# Patient Record
Sex: MALE | Race: BLACK OR AFRICAN AMERICAN | Employment: UNEMPLOYED | URBAN - METROPOLITAN AREA
[De-identification: names, ages, dates, MRNs, and addresses within clinical notes are randomized per-mention and may not be internally consistent; named-entity substitution may affect disease eponyms.]

---

## 2017-03-06 ENCOUNTER — OFFICE VISIT (OUTPATIENT)
Dept: INTERNAL MEDICINE CLINIC | Age: 32
End: 2017-03-06

## 2017-03-06 VITALS
TEMPERATURE: 98.6 F | SYSTOLIC BLOOD PRESSURE: 116 MMHG | BODY MASS INDEX: 45.32 KG/M2 | DIASTOLIC BLOOD PRESSURE: 70 MMHG | RESPIRATION RATE: 18 BRPM | WEIGHT: 306 LBS | OXYGEN SATURATION: 98 % | HEIGHT: 69 IN | HEART RATE: 99 BPM

## 2017-03-06 DIAGNOSIS — G43.909 MIGRAINE WITHOUT STATUS MIGRAINOSUS, NOT INTRACTABLE, UNSPECIFIED MIGRAINE TYPE: ICD-10-CM

## 2017-03-06 DIAGNOSIS — E78.00 HYPERCHOLESTEREMIA: ICD-10-CM

## 2017-03-06 DIAGNOSIS — Z51.81 ENCOUNTER FOR MONITORING TESTOSTERONE REPLACEMENT THERAPY: ICD-10-CM

## 2017-03-06 DIAGNOSIS — R79.89 LOW TESTOSTERONE: Primary | ICD-10-CM

## 2017-03-06 DIAGNOSIS — Z79.890 ENCOUNTER FOR MONITORING TESTOSTERONE REPLACEMENT THERAPY: ICD-10-CM

## 2017-03-06 DIAGNOSIS — R73.09 ELEVATED GLUCOSE: ICD-10-CM

## 2017-03-06 RX ORDER — TOPIRAMATE 50 MG/1
50 TABLET, FILM COATED ORAL DAILY
Qty: 90 TAB | Refills: 1 | Status: SHIPPED | OUTPATIENT
Start: 2017-03-06 | End: 2017-07-13

## 2017-03-06 NOTE — PATIENT INSTRUCTIONS
Hypogonadism: Care Instructions  Your Care Instructions  Men who have hypogonadism do not make enough testosterone. This hormone allows men to make sperm and to have normal physical male traits. The condition also is known as testosterone deficiency. It can lead to loss of sex drive, weakness, impotence, infertility, and weakened bones. Many things can cause this condition. Causes include injured testicles, certain medicines, an infection, and aging. Having a long-term health problem such as kidney or liver disease or being obese can cause it. So can surgery or radiation treatment for another health problem. It also can be present at birth. It is most often treated with testosterone hormone. You can get the hormone as a shot or through a patch or gel on the skin. Follow-up care is a key part of your treatment and safety. Be sure to make and go to all appointments, and call your doctor if you are having problems. It's also a good idea to know your test results and keep a list of the medicines you take. How can you care for yourself at home? · Take your medicines exactly as prescribed. Call your doctor if you think you are having a problem with your medicine. You will get more details on the specific medicines your doctor prescribes. · Follow your treatment plan. If you use testosterone hormones, follow your doctor's instructions. Hormones can help relieve many of the effects of this condition, such as impotence. But it may take weeks or months for your symptoms to improve. · Get plenty of exercise. And make sure to get plenty of calcium and vitamin D in your diet. Eat more dairy foods and green vegetables. They can help keep your bones from getting weak. · If you have a hard time dealing with this condition, talk to your doctor about joining a support group. Talking with others who have the same problems can help you cope. When should you call for help?   Call your doctor now or seek immediate medical care if:  · You have headaches. · You have problems with your vision. Watch closely for changes in your health, and be sure to contact your doctor if:  · You have trouble getting or keeping an erection. · You have a loss of body hair. · You feel weak or tired a lot of the time. · Your breasts are getting larger. · You do not get better as expected. Where can you learn more? Go to http://madonna-rakesh.info/. Enter 99 [id number] in the search box to learn more about \"Hypogonadism: Care Instructions. \"  Current as of: July 28, 2016  Content Version: 11.1  © 4808-9281 [employer]. Care instructions adapted under license by [employer] (which disclaims liability or warranty for this information). If you have questions about a medical condition or this instruction, always ask your healthcare professional. [address] any warranty or liability for your use of this information.

## 2017-03-06 NOTE — PROGRESS NOTES
HISTORY OF PRESENT ILLNESS  Landy Tyson is a 36 y.o. male. HPI Mr. Radha Higginbotham is here to discuss starting testosterone therapy. Lab Results   Component Value Date/Time    Testosterone 117 10/11/2016 04:05 PM   He has seen cardiology and was advised that everything was normal - had a stress echo. He does have sleep apnea, and has been using the CPAP. He does feel more rested on using it, however is still fatigued which is why he'd like to pursue testosterone therapy. He has lost weight. Wt Readings from Last 3 Encounters:   03/06/17 306 lb (138.8 kg)   10/28/16 320 lb (145.2 kg)   10/11/16 320 lb (145.2 kg)           Review of Systems   Constitutional: Positive for malaise/fatigue (fatigue). Eyes: Negative. Respiratory: Negative for cough and shortness of breath. Cardiovascular: Negative for chest pain and leg swelling. Gastrointestinal: Negative. Neurological: Positive for headaches (has been out of topamax, was helping with headaches. Would like to restart it. ). Physical Exam   Constitutional: He is oriented to person, place, and time. He appears well-developed and well-nourished. No distress. HENT:   Head: Normocephalic and atraumatic. Eyes: Conjunctivae are normal.   Cardiovascular: Normal rate and regular rhythm. Pulmonary/Chest: Effort normal and breath sounds normal. He has no wheezes. Neurological: He is alert and oriented to person, place, and time. Visit Vitals    /70 (BP 1 Location: Left arm, BP Patient Position: Sitting)    Pulse 99    Temp 98.6 °F (37 °C) (Oral)    Resp 18    Ht 5' 9\" (1.753 m)    Wt 306 lb (138.8 kg)    SpO2 98%    BMI 45.19 kg/m2     He will return for fasting labs tomorrow. ASSESSMENT and PLAN    ICD-10-CM ICD-9-CM    1. Low testosterone E29.1 257.2 TESTOSTERONE, FREE & TOTAL      PROSTATE SPECIFIC AG (PSA)   2.  Migraine without status migrainosus, not intractable, unspecified migraine type G43.909 346.90 topiramate (TOPAMAX) 50 mg tablet   3. Elevated glucose J83.77 938.44 METABOLIC PANEL, COMPREHENSIVE      HEMOGLOBIN A1C WITH EAG   4. Encounter for monitoring testosterone replacement therapy Z51.81 V58.83 TESTOSTERONE, FREE & TOTAL    Z79.899 V58.69 PROSTATE SPECIFIC AG (PSA)   5. Hypercholesteremia L29.64 944.7 METABOLIC PANEL, COMPREHENSIVE      LIPID PANEL   Pt verbalized understanding of their condition and diagnoses, treatment plan,  as well as side effects of any new medications prescribed.

## 2017-03-06 NOTE — PROGRESS NOTES
Chief Complaint   Patient presents with    Fatigue     1. Have you been to the ER, urgent care clinic since your last visit? Hospitalized since your last visit? No    2. Have you seen or consulted any other health care providers outside of the [address] [place] [address] since your last visit? Include any pap smears or colon screening.  No

## 2017-03-07 ENCOUNTER — LAB ONLY (OUTPATIENT)
Dept: INTERNAL MEDICINE CLINIC | Age: 32
End: 2017-03-07

## 2017-03-07 ENCOUNTER — HOSPITAL ENCOUNTER (OUTPATIENT)
Dept: LAB | Age: 32
Discharge: HOME | End: 2017-03-07
Payer: COMMERCIAL

## 2017-03-07 DIAGNOSIS — E78.00 HYPERCHOLESTEREMIA: ICD-10-CM

## 2017-03-07 DIAGNOSIS — R79.89 LOW TESTOSTERONE: ICD-10-CM

## 2017-03-07 DIAGNOSIS — Z51.81 ENCOUNTER FOR MONITORING TESTOSTERONE REPLACEMENT THERAPY: ICD-10-CM

## 2017-03-07 DIAGNOSIS — R73.09 ELEVATED GLUCOSE: ICD-10-CM

## 2017-03-07 DIAGNOSIS — Z79.890 ENCOUNTER FOR MONITORING TESTOSTERONE REPLACEMENT THERAPY: ICD-10-CM

## 2017-03-07 DIAGNOSIS — E78.00 HYPERCHOLESTEREMIA: Primary | ICD-10-CM

## 2017-03-07 LAB
ALBUMIN SERPL BCP-MCNC: 4.2 G/DL (ref 3.4–5)
ALBUMIN/GLOB SERPL: 1.3 {RATIO} (ref 0.8–1.7)
ALP SERPL-CCNC: 53 U/L (ref 45–117)
ALT SERPL-CCNC: 43 U/L (ref 16–61)
ANION GAP BLD CALC-SCNC: 10 MMOL/L (ref 3–18)
AST SERPL W P-5'-P-CCNC: 14 U/L (ref 15–37)
BILIRUB SERPL-MCNC: 0.3 MG/DL (ref 0.2–1)
BUN SERPL-MCNC: 19 MG/DL (ref 7–18)
BUN/CREAT SERPL: 23 (ref 12–20)
CALCIUM SERPL-MCNC: 9.1 MG/DL (ref 8.5–10.1)
CHLORIDE SERPL-SCNC: 104 MMOL/L (ref 100–108)
CHOLEST SERPL-MCNC: 160 MG/DL
CO2 SERPL-SCNC: 24 MMOL/L (ref 21–32)
CREAT SERPL-MCNC: 0.84 MG/DL (ref 0.6–1.3)
EST. AVERAGE GLUCOSE BLD GHB EST-MCNC: 111 MG/DL
GLOBULIN SER CALC-MCNC: 3.2 G/DL (ref 2–4)
GLUCOSE SERPL-MCNC: 97 MG/DL (ref 74–99)
HBA1C MFR BLD: 5.5 % (ref 4.2–5.6)
HDLC SERPL-MCNC: 39 MG/DL (ref 40–60)
HDLC SERPL: 4.1 {RATIO} (ref 0–5)
LDLC SERPL CALC-MCNC: 71.4 MG/DL (ref 0–100)
LIPID PROFILE,FLP: ABNORMAL
POTASSIUM SERPL-SCNC: 4.2 MMOL/L (ref 3.5–5.5)
PROT SERPL-MCNC: 7.4 G/DL (ref 6.4–8.2)
PSA SERPL-MCNC: 0.3 NG/ML (ref 0–4)
SODIUM SERPL-SCNC: 138 MMOL/L (ref 136–145)
TRIGL SERPL-MCNC: 248 MG/DL (ref ?–150)
VLDLC SERPL CALC-MCNC: 49.6 MG/DL

## 2017-03-07 PROCEDURE — 80053 COMPREHEN METABOLIC PANEL: CPT | Performed by: PHYSICIAN ASSISTANT

## 2017-03-07 PROCEDURE — 80061 LIPID PANEL: CPT | Performed by: PHYSICIAN ASSISTANT

## 2017-03-07 PROCEDURE — 84153 ASSAY OF PSA TOTAL: CPT | Performed by: PHYSICIAN ASSISTANT

## 2017-03-07 PROCEDURE — 84403 ASSAY OF TOTAL TESTOSTERONE: CPT | Performed by: PHYSICIAN ASSISTANT

## 2017-03-07 PROCEDURE — 83036 HEMOGLOBIN GLYCOSYLATED A1C: CPT | Performed by: PHYSICIAN ASSISTANT

## 2017-03-08 LAB
COMMENT, TESC2: ABNORMAL
TESTOST FREE SERPL-MCNC: 11.8 PG/ML (ref 9.3–26.5)
TESTOST SERPL-MCNC: 216 NG/DL (ref 348–1197)

## 2017-03-09 RX ORDER — TESTOSTERONE CYPIONATE 200 MG/ML
200 INJECTION INTRAMUSCULAR
Qty: 10 ML | Refills: 0
Start: 2017-03-09 | End: 2017-07-13 | Stop reason: SDUPTHER

## 2017-03-09 NOTE — TELEPHONE ENCOUNTER
His testosterone is low. His A1c is down some, so that's good. He should keep working on weight loss. So, if he wants to start testosterone we can. We had talked about injections. He would have to  the prescriptions.

## 2017-03-13 ENCOUNTER — DOCUMENTATION ONLY (OUTPATIENT)
Dept: INTERNAL MEDICINE CLINIC | Age: 32
End: 2017-03-13

## 2017-03-14 ENCOUNTER — DOCUMENTATION ONLY (OUTPATIENT)
Dept: INTERNAL MEDICINE CLINIC | Age: 32
End: 2017-03-14

## 2017-07-13 ENCOUNTER — OFFICE VISIT (OUTPATIENT)
Dept: INTERNAL MEDICINE CLINIC | Age: 32
End: 2017-07-13

## 2017-07-13 VITALS
BODY MASS INDEX: 46.36 KG/M2 | DIASTOLIC BLOOD PRESSURE: 70 MMHG | HEART RATE: 120 BPM | OXYGEN SATURATION: 97 % | TEMPERATURE: 98.8 F | RESPIRATION RATE: 18 BRPM | SYSTOLIC BLOOD PRESSURE: 103 MMHG | WEIGHT: 313 LBS | HEIGHT: 69 IN

## 2017-07-13 DIAGNOSIS — J02.0 STREP THROAT: ICD-10-CM

## 2017-07-13 DIAGNOSIS — E29.1 HYPOGONADISM IN MALE: ICD-10-CM

## 2017-07-13 DIAGNOSIS — J02.9 SORE THROAT: Primary | ICD-10-CM

## 2017-07-13 LAB
S PYO AG THROAT QL: POSITIVE
VALID INTERNAL CONTROL?: YES

## 2017-07-13 RX ORDER — TESTOSTERONE CYPIONATE 200 MG/ML
200 INJECTION INTRAMUSCULAR
Qty: 10 ML | Refills: 0 | Status: SHIPPED | OUTPATIENT
Start: 2017-07-13

## 2017-07-13 RX ORDER — AMOXICILLIN 875 MG/1
875 TABLET, FILM COATED ORAL 2 TIMES DAILY
Qty: 20 TAB | Refills: 0 | Status: SHIPPED | OUTPATIENT
Start: 2017-07-13 | End: 2017-08-24 | Stop reason: SDUPTHER

## 2017-07-13 NOTE — PROGRESS NOTES
Chief Complaint   Patient presents with    Sore Throat     1. Have you been to the ER, urgent care clinic since your last visit? Hospitalized since your last visit? No    2. Have you seen or consulted any other health care providers outside of the [address] [place] [address] since your last visit? Include any pap smears or colon screening.  No

## 2017-07-13 NOTE — PROGRESS NOTES
HISTORY OF PRESENT ILLNESS  Terrye Cheadle is a 29 y.o. male. HPI Mr. Daniela Braden is here for c/o 3 week history of sore throat. He was tested and treated for strep, but admits he was non-compliant with his antibiotics. He took it for a few days then stopped, then started again, then stopped. Several days ago, he went to bed and woke up feeling achy, fatigued and worsening sore throat. He also has stopped taking testosterone b/c he took his Rx with him to [place] and had it filled there and could not get the refill back here in [address] [facility] Review of Systems   Constitutional: Negative. HENT: Positive for ear pain and sore throat. Eyes: Negative. Respiratory: Negative. Cardiovascular: Negative. Gastrointestinal: Positive for nausea (yesterday). Neurological: Negative for dizziness. Physical Exam   Constitutional: He is oriented to person, place, and time. He appears well-developed and well-nourished. No distress. HENT:   Head: Normocephalic and atraumatic. Right Ear: Tympanic membrane is not injected. Left Ear: Tympanic membrane is injected. Mouth/Throat: Posterior oropharyngeal erythema (inflammed, enlarged tonsils) present. No oropharyngeal exudate. Cardiovascular: Tachycardia present. Pulmonary/Chest: Effort normal and breath sounds normal. He has no wheezes. Musculoskeletal: He exhibits no edema. Neurological: He is alert and oriented to person, place, and time. Visit Vitals    /70 (BP 1 Location: Left arm, BP Patient Position: Sitting)    Pulse (!) 120    Temp 98.8 °F (37.1 °C) (Oral)    Resp 18    Ht 5' 9\" (1.753 m)    Wt 313 lb (142 kg)    SpO2 97%    BMI 46.22 kg/m2   Pulse elevated: has been working in the heat all day. Results for orders placed or performed in visit on 07/13/17   AMB POC RAPID STREP A   Result Value Ref Range    VALID INTERNAL CONTROL POC Yes     Group A Strep Ag Positive Negative       ASSESSMENT and PLAN    ICD-10-CM ICD-9-CM    1.  Sore throat J02.9 462 AMB POC RAPID STREP A   2. Hypogonadism in male E29.1 257.2 testosterone cypionate (DEPOTESTOTERONE CYPIONATE) 200 mg/mL injection   3. Strep throat J02.0 034.0 amoxicillin (AMOXIL) 875 mg tablet   advised him to make sure he completes this course of abx. Pt verbalized understanding of their condition and diagnoses, treatment plan,  as well as side effects of any new medications prescribed.

## 2017-08-24 ENCOUNTER — OFFICE VISIT (OUTPATIENT)
Dept: INTERNAL MEDICINE CLINIC | Age: 32
End: 2017-08-24

## 2017-08-24 VITALS
BODY MASS INDEX: 46.65 KG/M2 | HEART RATE: 81 BPM | SYSTOLIC BLOOD PRESSURE: 126 MMHG | HEIGHT: 69 IN | DIASTOLIC BLOOD PRESSURE: 82 MMHG | OXYGEN SATURATION: 98 % | TEMPERATURE: 98.1 F | WEIGHT: 315 LBS | RESPIRATION RATE: 18 BRPM

## 2017-08-24 DIAGNOSIS — J02.0 STREP THROAT: ICD-10-CM

## 2017-08-24 LAB
S PYO AG THROAT QL: POSITIVE
VALID INTERNAL CONTROL?: YES

## 2017-08-24 RX ORDER — AMOXICILLIN 875 MG/1
875 TABLET, FILM COATED ORAL 2 TIMES DAILY
Qty: 20 TAB | Refills: 0 | Status: SHIPPED | OUTPATIENT
Start: 2017-08-24 | End: 2017-09-03

## 2017-08-24 NOTE — LETTER
NOTIFICATION RETURN TO WORK / SCHOOL 
 
8/24/2017 9:24 AM 
 
Mr. Delroy Ram [address] [facility] [address] To Whom It May Concern: 
 
Delroy Ram is currently under the care of [address] [place] [address]. He will return to work on: 8/26/17 If there are questions or concerns please have the patient contact our office. Sincerely, Rosa Ceballos MD

## 2017-08-24 NOTE — PATIENT INSTRUCTIONS

## 2017-08-24 NOTE — PROGRESS NOTES
Subjective:   Timbo Melchor is a 29 y.o.  male who presents for Sore Throat (pt says possible strep)    HPI: Pt presents for c/o sore throat that began this morning with associated ear discomfort. He denies fever/chills, nasal congestion, cough, or n/v.  He does not identify any sick contacts. Pt was diagnosed with strep throat approximately one month ago. He admits to non-compliance with antibiotic therapy in the past (starting, then stopping therapy before completion). He has not attempted to treat his symptoms. Review of Systems   Constitutional: Negative for chills, fever and malaise/fatigue. HENT: Positive for ear pain. Negative for congestion, ear discharge, hearing loss, nosebleeds and tinnitus. Eyes: Negative. Respiratory: Negative for cough and shortness of breath. Cardiovascular: Negative. Gastrointestinal: Negative for heartburn, nausea and vomiting. Skin: Negative. Current Outpatient Prescriptions on File Prior to Visit   Medication Sig Dispense Refill    testosterone cypionate (DEPOTESTOTERONE CYPIONATE) 200 mg/mL injection 1 mL by IntraMUSCular route every fourteen (14) days. Max Daily Amount: 200 mg. 10 mL 0    Syringe with Needle, Disp, syrg 1 Box by Does Not Apply route every fourteen (14) days. Injection testosterone every 14 days 1 Box 0     No current facility-administered medications on file prior to visit. Reviewed PmHx, RxHx, FmHx, SocHx, AllgHx and updated and dated in the chart. Nurse notes were reviewed and are correct    Objective:     Vitals:    08/24/17 0856 08/24/17 0858   BP: 147/82 126/82   Pulse: 81    Resp: 18    Temp: 98.1 °F (36.7 °C)    TempSrc: Oral    SpO2: 98%    Weight: 318 lb (144.2 kg)    Height: 5' 9\" (1.753 m)      Physical Exam   Constitutional: He is oriented to person, place, and time. He appears well-developed and well-nourished. HENT:   Head: Normocephalic and atraumatic.    Right Ear: Hearing, tympanic membrane, external ear and ear canal normal.   Left Ear: Hearing, tympanic membrane, external ear and ear canal normal.   Nose: Nose normal.   Mouth/Throat: Uvula is midline. Abnormal dentition. Posterior oropharyngeal erythema present. No oropharyngeal exudate or tonsillar abscesses. Eyes: Conjunctivae are normal.   Neck: Normal range of motion. Neck supple. Cardiovascular: Normal rate, regular rhythm, normal heart sounds and intact distal pulses. Pulmonary/Chest: Effort normal and breath sounds normal.   Lymphadenopathy:     He has no cervical adenopathy. Neurological: He is alert and oriented to person, place, and time. Skin: Skin is warm and dry. Nursing note and vitals reviewed. Assessment/ Plan:     Diagnoses and all orders for this visit:    1. Strep throat        -     Rapid strep positive  -     amoxicillin (AMOXIL) 875 mg tablet; Take 1 Tab by mouth two (2) times a day for 10 days.  -     Emphasized importance of completing antibiotic therapy as directed  -     Advised to perform warm saline gargles, may use acetaminophen prn for pain and fever  -     Work note given     I have discussed the diagnosis with the patient and the intended plan as seen in the above orders. The patient verbalized understanding and agrees with the plan.     Follow-up Disposition: Not on [facility], MD

## 2017-08-24 NOTE — PROGRESS NOTES
Chief Complaint   Patient presents with    Sore Throat     pt says possible strep     1. Have you been to the ER, urgent care clinic since your last visit? Hospitalized since your last visit? No    2. Have you seen or consulted any other health care providers outside of the [address] [place] [address] since your last visit? Include any pap smears or colon screening.  No

## 2017-08-24 NOTE — MR AVS SNAPSHOT
Visit Information Date & Time Provider Department Dept. Phone Encounter #  
 8/24/2017  9:00 AM Wade Haro MD Patient Lily Castellon [id number] Follow-up Instructions Return if symptoms worsen or fail to improve. Your Appointments 9/28/2017  8:30 AM  
PHYSICAL with Allie Hart PA-C [facility]) Appt Note: CPE yrly Rodney Christiano [phone] [facility] [address]  
[phone]  
  
   
 Kadeem Audrey [phone] [place] [address] Upcoming Health Maintenance Date Due INFLUENZA AGE 9 TO ADULT 8/1/2017 DTaP/Tdap/Td series (2 - Td) 8/19/2025 Allergies as of 8/24/2017  Review Complete On: 8/24/2017 By: Vijaya Wan Severity Noted Reaction Type Reactions Imitrex [Sumatriptan Succinate]  09/30/2016    Other (comments) Altered Mental Status Current Immunizations  Never Reviewed Name Date Influenza Vaccine Zhengbrendanronda Saha) 10/28/2016 Tdap 8/19/2015 Not reviewed this visit You Were Diagnosed With   
  
 Codes Comments Strep throat     ICD-10-CM: J02.0 ICD-9-CM: 034.0 Vitals BP Pulse Temp Resp Height(growth percentile) Weight(growth percentile) 126/82 (BP 1 Location: Left arm, BP Patient Position: Sitting) 81 98.1 °F (36.7 °C) (Oral) 18 5' 9\" (1.753 m) 318 lb (144.2 kg) SpO2 BMI Smoking Status 98% 46.96 kg/m2 Current Every Day Smoker Vitals History BMI and BSA Data Body Mass Index Body Surface Area  
 46.96 kg/m 2 2.65 m 2 Preferred Pharmacy Pharmacy Name Phone [facility] [place], [place] - [phone] [facility] [phone] Your Updated Medication List  
  
   
This list is accurate as of: 8/24/17  9:24 AM.  Always use your most recent med list.  
  
  
  
  
 amoxicillin 875 mg tablet Commonly known as:  AMOXIL Take 1 Tab by mouth two (2) times a day for 10 days. Syringe with Needle (Neymar Bridges) Syrg 1 Box by Does Not Apply route every fourteen (14) days. Injection testosterone every 14 days  
  
 testosterone cypionate 200 mg/mL injection Commonly known as:  DEPOTESTOTERONE CYPIONATE 1 mL by IntraMUSCular route every fourteen (14) days. Max Daily Amount: 200 mg. Prescriptions Sent to Pharmacy Refills  
 amoxicillin (AMOXIL) 875 mg tablet 0 Sig: Take 1 Tab by mouth two (2) times a day for 10 days. Class: Normal  
 Pharmacy: [facility]/pharmacy #[id number] [facility] [address] [place] [address] #: [phone] Route: Oral  
  
Follow-up Instructions Return if symptoms worsen or fail to improve. Patient Instructions Strep Throat: Care Instructions Your Care Instructions Strep throat is a bacterial infection that causes sudden, severe sore throat and fever. Strep throat, which is caused by bacteria called streptococcus, is treated with antibiotics. Sometimes a strep test is necessary to tell if the sore throat is caused by strep bacteria. Treatment can help ease symptoms and may prevent future problems. Follow-up care is a key part of your treatment and safety. Be sure to make and go to all appointments, and call your doctor if you are having problems. It's also a good idea to know your test results and keep a list of the medicines you take. How can you care for yourself at home? · Take your antibiotics as directed. Do not stop taking them just because you feel better. You need to take the full course of antibiotics. · Strep throat can spread to others until 24 hours after you begin taking antibiotics. During this time, you should avoid contact with other people at work or home, especially infants and children. Do not sneeze or cough on others, and wash your hands often. Keep your drinking glass and eating utensils separate from those of others, and wash these items well in hot, soapy water. · Gargle with warm salt water at least once each hour to help reduce swelling and make your throat feel better. Use 1 teaspoon of salt mixed in 8 fluid ounces of warm water. · Take an over-the-counter pain medication, such as acetaminophen (Tylenol), ibuprofen (Advil, Motrin), or naproxen (Aleve). Read and follow all instructions on the label. · Try an over-the-counter anesthetic throat spray or throat lozenges, which may help relieve throat pain. · Drink plenty of fluids. Fluids may help soothe an irritated throat. Hot fluids, such as tea or soup, may help your throat feel better. · Eat soft solids and drink plenty of clear liquids. Flavored ice pops, ice cream, scrambled eggs, sherbet, and gelatin dessert (such as Jell-O) may also soothe the throat. · Get lots of rest. 
· Do not smoke, and avoid secondhand smoke. If you need help quitting, talk to your doctor about stop-smoking programs and medicines. These can increase your chances of quitting for good. · Use a vaporizer or humidifier to add moisture to the air in your bedroom. Follow the directions for cleaning the machine. When should you call for help? Call your doctor now or seek immediate medical care if: 
· You have a new or higher fever. · You have a fever with a stiff neck or severe headache. · You have new or worse trouble swallowing. · Your sore throat gets much worse on one side. · Your pain becomes much worse on one side of your throat. Watch closely for changes in your health, and be sure to contact your doctor if: 
· You are not getting better after 2 days (48 hours). · You do not get better as expected. Where can you learn more? Go to http://madonna-rakesh.info/. Enter K625 in the search box to learn more about \"Strep Throat: Care Instructions. \" Current as of: July 29, 2016 Content Version: 11.3 © 2026-4175 [employer].  Care instructions adapted under license by [address] (which disclaims liability or warranty for this information). If you have questions about a medical condition or this instruction, always ask your healthcare professional. [address] any warranty or liability for your use of this information. Introducing [facility]! Dear Indira Reagan: 
Thank you for requesting a [employer] account. Our records indicate that you already have an active [employer] account. You can access your account anytime at [email or web address] Did you know that you can access your hospital and ER discharge instructions at any time in [employer]? You can also review all of your test results from your hospital stay or ER visit. Additional Information If you have questions, please visit the Frequently Asked Questions section of the [employer] website at [email or web address] Remember, [employer] is NOT to be used for urgent needs. For medical emergencies, dial 911. Now available from your iPhone and Android! Please provide this summary of care documentation to your next provider. Your primary care clinician is listed as Yusuf Fontaine. If you have any questions after today's visit, please call [phone].

## 2017-10-25 ENCOUNTER — OFFICE VISIT (OUTPATIENT)
Dept: INTERNAL MEDICINE CLINIC | Age: 32
End: 2017-10-25

## 2017-10-25 VITALS
OXYGEN SATURATION: 97 % | DIASTOLIC BLOOD PRESSURE: 80 MMHG | TEMPERATURE: 98.5 F | HEIGHT: 69 IN | BODY MASS INDEX: 46.65 KG/M2 | SYSTOLIC BLOOD PRESSURE: 126 MMHG | HEART RATE: 99 BPM | WEIGHT: 315 LBS | RESPIRATION RATE: 18 BRPM

## 2017-10-25 DIAGNOSIS — Z87.09 HISTORY OF STREP SORE THROAT: Primary | ICD-10-CM

## 2017-10-25 DIAGNOSIS — J03.01 RECURRENT STREPTOCOCCAL TONSILLITIS: ICD-10-CM

## 2017-10-25 NOTE — PATIENT INSTRUCTIONS
Tonsillitis: Care Instructions  Your Care Instructions    Tonsillitis is an infection of the tonsils that is caused by bacteria or a virus. The tonsils are in the back of the throat and are part of the immune system. Tonsillitis typically lasts from a few days up to a couple of weeks. Tonsillitis caused by a virus goes away on its own. Tonsillitis caused by the bacteria that causes strep throat is treated with antibiotics. You and your doctor may consider surgery to remove the tonsils (tonsillectomy) if you have serious complications or repeat infections. Follow-up care is a key part of your treatment and safety. Be sure to make and go to all appointments, and call your doctor if you are having problems. It's also a good idea to know your test results and keep a list of the medicines you take. How can you care for yourself at home? · If your doctor prescribed antibiotics, take them as directed. Do not stop taking them just because you feel better. You need to take the full course of antibiotics. · Gargle with warm salt water. This helps reduce swelling and relieve discomfort. Gargle once an hour with 1 teaspoon of salt mixed in 8 fluid ounces of warm water. · Take an over-the-counter pain medicine, such as acetaminophen (Tylenol), ibuprofen (Advil, Motrin), or naproxen (Aleve). Be safe with medicines. Read and follow all instructions on the label. No one younger than 20 should take aspirin. It has been linked to Reye syndrome, a serious illness. · Be careful when taking over-the-counter cold or flu medicines and Tylenol at the same time. Many of these medicines have acetaminophen, which is Tylenol. Read the labels to make sure that you are not taking more than the recommended dose. Too much acetaminophen (Tylenol) can be harmful. · Try an over-the-counter throat spray to relieve throat pain. · Drink plenty of fluids. Fluids may help soothe an irritated throat.  Drink warm or cool liquids (whichever feels better). These include tea, soup, and juice. · Do not smoke, and avoid secondhand smoke. Smoking can make tonsillitis worse. If you need help quitting, talk to your doctor about stop-smoking programs and medicines. These can increase your chances of quitting for good. · Use a vaporizer or humidifier to add moisture to your bedroom. Follow the directions for cleaning the machine. When should you call for help? Call your doctor now or seek immediate medical care if:  · Your pain gets worse on one side of your throat. · You have a new or higher fever. · You notice changes in your voice. · You have trouble opening your mouth. · You have any trouble breathing. · You have much more trouble swallowing. · You have a fever with a stiff neck or a severe headache. · You are sensitive to light or feel very sleepy or confused. Watch closely for changes in your health, and be sure to contact your doctor if:  · You do not get better after 2 days. Where can you learn more? Go to http://madonna-rakesh.info/. Enter A138 in the search box to learn more about \"Tonsillitis: Care Instructions. \"  Current as of: July 29, 2016  Content Version: 11.3  © 2031-5356 [employer]. Care instructions adapted under license by [employer] (which disclaims liability or warranty for this information). If you have questions about a medical condition or this instruction, always ask your healthcare professional. [place] [address] any warranty or liability for your use of this information.

## 2017-10-25 NOTE — PROGRESS NOTES
Chief Complaint   Patient presents with   [facility] Follow Up     strep needs referral to ENT     1. Have you been to the ER, urgent care clinic since your last visit? Hospitalized since your last visit? Yes [facility]    2. Have you seen or consulted any other health care providers outside of the [address] [place] [address] since your last visit? Include any pap smears or colon screening.  No

## 2017-10-25 NOTE — PROGRESS NOTES
HISTORY OF PRESENT JUAN CARLOS Dickinson is a 29 y.o. male. HPI Mr. Janelle Cook is here for follow up after being diagnosed with strep 2 days ago. He states this is about the 4th time since June where he has had positive strep tests. He admits there are several times he has felt ill with sore throat and associated sx, but wasn't evaluated. He estimates he feels better for 2-3 weeks between episodes. He does not recall h/o sore throat or recurrent strep as a child. He has sleep apnea. He denies difficultly swallowing at times other than when he is ill. He denies any family members with strep or recurrent strep infections. Review of Systems   Constitutional: Negative for chills and fever. HENT: Positive for sore throat. Respiratory: Positive for cough (slight). Negative for sputum production, shortness of breath and wheezing. Cardiovascular: Negative. Neurological: Negative. Physical Exam   Constitutional: He appears well-developed and well-nourished. No distress. HENT:   Head: Normocephalic and atraumatic. Mouth/Throat: Posterior oropharyngeal erythema present. Tonsillar abscesses: enlarged tonsils. Cardiovascular: Normal rate and regular rhythm. Pulmonary/Chest: Effort normal and breath sounds normal.   Lymphadenopathy:     He has cervical adenopathy. Visit Vitals    /80 (BP 1 Location: Left arm, BP Patient Position: Sitting)    Pulse 99    Temp 98.5 °F (36.9 °C) (Oral)    Resp 18    Ht 5' 9\" (1.753 m)    Wt 323 lb (146.5 kg)    SpO2 97%    BMI 47.7 kg/m2       ASSESSMENT and PLAN    ICD-10-CM ICD-9-CM    1. History of strep sore throat Z87.09 V12.09 REFERRAL TO ENT-OTOLARYNGOLOGY   2. Recurrent streptococcal tonsillitis J03.01 034.0 REFERRAL TO ENT-OTOLARYNGOLOGY     Pt verbalized understanding of their condition and diagnoses, treatment plan,  as well as side effects of any new medications prescribed.

## 2017-11-07 ENCOUNTER — OFFICE VISIT (OUTPATIENT)
Dept: INTERNAL MEDICINE CLINIC | Age: 32
End: 2017-11-07

## 2017-11-07 VITALS
HEART RATE: 100 BPM | RESPIRATION RATE: 18 BRPM | OXYGEN SATURATION: 97 % | SYSTOLIC BLOOD PRESSURE: 132 MMHG | BODY MASS INDEX: 46.65 KG/M2 | WEIGHT: 315 LBS | DIASTOLIC BLOOD PRESSURE: 88 MMHG | HEIGHT: 69 IN | TEMPERATURE: 98.4 F

## 2017-11-07 DIAGNOSIS — J02.9 SORE THROAT: Primary | ICD-10-CM

## 2017-11-07 LAB
S PYO AG THROAT QL: NEGATIVE
VALID INTERNAL CONTROL?: YES

## 2017-11-07 NOTE — PROGRESS NOTES
Subjective:   Tomas Cunha is a 29 y.o.  male who presents for Sore Throat    HPI: Pt presents today c/o sore throat x1 day. He denies fever/chills, ear pain, nasal congestion, cough, n/v, headache, body aches, or sick contacts. He has not attempted to treat his symptoms. He has been diagnosed with strept throat 4 times over the past 6 months. He is scheduled for evaluation with ENT this week 11/10/17. Review of Systems   Constitutional: Negative for chills, fever and malaise/fatigue. HENT: Positive for sore throat. Negative for congestion, ear discharge, ear pain, hearing loss, nosebleeds, sinus pain and tinnitus. Eyes: Negative. Respiratory: Negative. Negative for stridor. Cardiovascular: Negative. Musculoskeletal: Negative. Skin: Negative. Neurological: Negative. Current Outpatient Prescriptions on File Prior to Visit   Medication Sig Dispense Refill    testosterone cypionate (DEPOTESTOTERONE CYPIONATE) 200 mg/mL injection 1 mL by IntraMUSCular route every fourteen (14) days. Max Daily Amount: 200 mg. 10 mL 0    Syringe with Needle, Disp, syrg 1 Box by Does Not Apply route every fourteen (14) days. Injection testosterone every 14 days 1 Box 0     No current facility-administered medications on file prior to visit. Reviewed PmHx, RxHx, FmHx, SocHx, AllgHx and updated and dated in the chart. Nurse notes were reviewed and are correct    Objective:     Vitals:    11/07/17 1058   BP: 132/88   Pulse: 100   Resp: 18   Temp: 98.4 °F (36.9 °C)   TempSrc: Oral   SpO2: 97%   Weight: 318 lb (144.2 kg)   Height: 5' 9\" (1.753 m)     Physical Exam   Constitutional: He is oriented to person, place, and time. He appears well-developed and well-nourished. HENT:   Head: Normocephalic and atraumatic.    Right Ear: Hearing, tympanic membrane, external ear and ear canal normal.   Left Ear: Hearing, tympanic membrane, external ear and ear canal normal.   Mouth/Throat: Uvula is midline and mucous membranes are normal. Posterior oropharyngeal erythema present. No oropharyngeal exudate, posterior oropharyngeal edema or tonsillar abscesses. Tonsils are 1+ on the right. Tonsils are 1+ on the left. No tonsillar exudate. Eyes: Conjunctivae and EOM are normal. Pupils are equal, round, and reactive to light. Neck: Normal range of motion. Neck supple. No thyromegaly present. Cardiovascular: Normal rate, regular rhythm, normal heart sounds and intact distal pulses. Pulmonary/Chest: Effort normal and breath sounds normal.   Lymphadenopathy:     He has no cervical adenopathy. Neurological: He is alert and oriented to person, place, and time. Skin: Skin is warm and dry. Nursing note and vitals reviewed. Assessment/ Plan:     Diagnoses and all orders for this visit:    1. Sore throat        -     History of recurrent sore throat and positive rapid strep test 4 times over the past 6 months (Rapid strep test negative today)  -     AMB POC RAPID STREP A - negative  -     Likely viral etiology  -     Advised to perform warm saline gargles, take acetaminophen prn  -     Keep scheduled ENT appointment       I have discussed the diagnosis with the patient and the intended plan as seen in the above orders. The patient verbalized understanding and agrees with the plan.     Follow-up Disposition: Not on [facility], MD

## 2017-11-07 NOTE — LETTER
NOTIFICATION RETURN TO WORK / SCHOOL 
 
11/7/2017 11:13 AM 
 
Mr. Kriss Meza [address] [facility] [id number] To Whom It May Concern: 
 
Kriss Meza is currently under the care of [address] [place] [address]. He will return to work on: 11/8/17 If there are questions or concerns please have the patient contact our office. Sincerely, Viktor Head MD

## 2017-11-07 NOTE — PROGRESS NOTES
Chief Complaint   Patient presents with    Sore Throat   1. Have you been to the ER, urgent care clinic since your last visit? Hospitalized since your last visit? No    2. Have you seen or consulted any other health care providers outside of the [address] [place] [address] since your last visit? Include any pap smears or colon screening.  No

## 2018-10-01 ENCOUNTER — OFFICE VISIT (OUTPATIENT)
Dept: INTERNAL MEDICINE CLINIC | Age: 33
End: 2018-10-01

## 2018-10-01 VITALS
RESPIRATION RATE: 18 BRPM | BODY MASS INDEX: 46.65 KG/M2 | HEART RATE: 104 BPM | TEMPERATURE: 98.2 F | WEIGHT: 315 LBS | OXYGEN SATURATION: 97 % | HEIGHT: 69 IN | DIASTOLIC BLOOD PRESSURE: 84 MMHG | SYSTOLIC BLOOD PRESSURE: 127 MMHG

## 2018-10-01 DIAGNOSIS — E66.01 OBESITY, MORBID (HCC): ICD-10-CM

## 2018-10-01 DIAGNOSIS — G47.30 SLEEP APNEA, UNSPECIFIED TYPE: Primary | ICD-10-CM

## 2018-10-01 PROBLEM — G47.33 OBSTRUCTIVE SLEEP APNEA SYNDROME: Status: ACTIVE | Noted: 2018-10-01

## 2018-10-01 NOTE — PROGRESS NOTES
HISTORY OF PRESENT ILLNESS Roc Etienne is a 34 y.o. male. HPI Mr. Peewee Flores is here for paperwork for [employer] due to requiring a CPAP machine. He states he does use it, and feels better when he is using it. He has not been taking his testosterone and does say he needs to come in for a physical.  
 
Review of Systems Constitutional: Negative. HENT: Negative. Respiratory: Negative for cough and shortness of breath. Cardiovascular: Negative for chest pain. Physical Exam  
Constitutional: He is oriented to person, place, and time. He appears well-developed and well-nourished. No distress. HENT:  
Head: Normocephalic and atraumatic. Cardiovascular: Regular rhythm. Mild tachycardia Pulmonary/Chest: Effort normal and breath sounds normal. He has no wheezes. Neurological: He is alert and oriented to person, place, and time. Visit Vitals  /84 (BP 1 Location: Left arm, BP Patient Position: Sitting)  Pulse (!) 104  Temp 98.2 °F (36.8 °C) (Oral)  Resp 18  Ht 5' 9\" (1.753 m)  Wt 332 lb (150.6 kg)  SpO2 97%  BMI 49.03 kg/m2 Wt Readings from Last 3 Encounters:  
10/01/18 332 lb (150.6 kg) 11/07/17 318 lb (144.2 kg) 10/25/17 323 lb (146.5 kg) ASSESSMENT and PLAN 
  ICD-10-CM ICD-9-CM 1. Sleep apnea, unspecified type G47.30 780.57 Completed paperwork 2. [facility] E66.01 278.01 Recommend increase exercise, diet and weight reduction Pt verbalized understanding of their condition and diagnoses, treatment plan,  as well as side effects of any new medications prescribed.

## 2018-10-01 NOTE — PROGRESS NOTES
Chief Complaint Patient presents with  Form Completion  
  sleep apena 1. Have you been to the ER, urgent care clinic since your last visit? Hospitalized since your last visit? No 
 
2. Have you seen or consulted any other health care providers outside of the [address] [place] [address] since your last visit? Include any pap smears or colon screening.  No